# Patient Record
Sex: FEMALE | Race: WHITE | Employment: FULL TIME | URBAN - METROPOLITAN AREA
[De-identification: names, ages, dates, MRNs, and addresses within clinical notes are randomized per-mention and may not be internally consistent; named-entity substitution may affect disease eponyms.]

---

## 2019-06-17 ENCOUNTER — OFFICE VISIT (OUTPATIENT)
Dept: NEUROLOGY | Facility: CLINIC | Age: 24
End: 2019-06-17
Payer: COMMERCIAL

## 2019-06-17 VITALS
WEIGHT: 162.5 LBS | HEART RATE: 80 BPM | HEIGHT: 60 IN | SYSTOLIC BLOOD PRESSURE: 96 MMHG | DIASTOLIC BLOOD PRESSURE: 70 MMHG | BODY MASS INDEX: 31.9 KG/M2 | RESPIRATION RATE: 17 BRPM

## 2019-06-17 DIAGNOSIS — R76.8 POSITIVE ANA (ANTINUCLEAR ANTIBODY): ICD-10-CM

## 2019-06-17 DIAGNOSIS — G43.101 MIGRAINE WITH AURA AND WITH STATUS MIGRAINOSUS, NOT INTRACTABLE: Primary | ICD-10-CM

## 2019-06-17 DIAGNOSIS — M54.2 NECK PAIN: ICD-10-CM

## 2019-06-17 PROCEDURE — 99214 OFFICE O/P EST MOD 30 MIN: CPT | Performed by: PHYSICIAN ASSISTANT

## 2019-06-17 RX ORDER — RIZATRIPTAN BENZOATE 10 MG/1
TABLET ORAL
Qty: 10 TABLET | Refills: 2 | Status: SHIPPED | OUTPATIENT
Start: 2019-06-17 | End: 2019-08-08

## 2019-06-17 RX ORDER — PROPRANOLOL HCL 60 MG
CAPSULE, EXTENDED RELEASE 24HR ORAL
Refills: 0 | COMMUNITY
Start: 2019-05-21 | End: 2020-02-20

## 2019-06-17 NOTE — PROGRESS NOTES
[facility] with [address] [facility]  1/13/1995  Primary Care Provider:  None Pcp    6/17/2019  Accompanied visit: No      25year old female patient being seen for: Migraines    Patient w 2  PRN:     Allergies:  No Known Allergies         EXAM:  BP 96/70 (BP Location: Right arm, Patient Position: Sitting, Cuff Size: large)   Pulse 80   Resp 17   Ht 60\"   Wt 162 lb 8 oz   LMP 06/10/2019 (Exact Date)   Breastfeeding?  No   BMI 31.74 kg/m²   L

## 2019-06-24 ENCOUNTER — TELEPHONE (OUTPATIENT)
Dept: SURGERY | Facility: CLINIC | Age: 24
End: 2019-06-24

## 2019-06-24 NOTE — TELEPHONE ENCOUNTER
MRA had/MRI Brain denial reason:    Reason:  For 07376 MRA HEAD W/O CONTRAST Based on [employer] Head Imaging Guidelines Section: HD 11.3 Sudden Onset of Headache, we are unable to approve this request. An MRA or CTA of the head in the evaluation of headache s (TTY/TDD: 876), send a fax to [phone],      Standard Appeal    You, or an authorized representative (see above process for choosing someone to act for you), may appeal in writing or by phone.  To send an appeal in writing use the contact information ab

## 2019-07-08 NOTE — TELEPHONE ENCOUNTER
Left voice-mail message for patient informing her that MRI/MRA was denied by her insurance and to call us back.

## 2019-07-09 NOTE — TELEPHONE ENCOUNTER
Pt called back and looking for MRA Head & MRI Brain to be rewritten to get authorized. Call pt when completed.

## 2019-07-10 NOTE — TELEPHONE ENCOUNTER
Olimpia Will reports that her ENT is appealing the denial of MRI brain, and anticipates a determination by Friday. She will update office with outcome for next steps.

## 2019-07-10 NOTE — TELEPHONE ENCOUNTER
I am aware that patient prior to seeing me had an MRI Brain ordered by pcp and ENT. Please follow-up regarding the status of the MRI's ordered through them.

## 2019-07-16 NOTE — TELEPHONE ENCOUNTER
Pt updating us on status of MRI denial.  Dr. Tova Al office has completed the appeal and expect response soon.

## 2019-07-18 NOTE — TELEPHONE ENCOUNTER
Ashley Kyle called from Dr Quintero Batch office [phone] ext [phone]. She said that she is having problems completing the appeal for their MRI Brain because they do not have enough notes regarding patients headache (she was since only once).  She was only trying to do

## 2019-07-22 NOTE — TELEPHONE ENCOUNTER
Did peer to peer and they said we need to get information from the patient on how she is doing at this point she does not fit the criteria for MRI Brain approval.

## 2019-07-22 NOTE — TELEPHONE ENCOUNTER
Fatou Avila informed of continued denial after peer to peer. She reports that she has fewer dizzy spells after increasing amitritypline to daily usage. She does wake up dizzy and foggy but gets better after eating.     Does get migraines with some of the

## 2019-07-23 NOTE — TELEPHONE ENCOUNTER
Pt concerned about imaging denials. Her previous physician did not have any record of MRI they ordered. They also did not have much documentation in regards to migraine.   She is obtaining MRI report from [facility], suggested she also obtain CD of imag

## 2019-08-08 ENCOUNTER — APPOINTMENT (OUTPATIENT)
Dept: LAB | Age: 24
End: 2019-08-08
Attending: Other
Payer: COMMERCIAL

## 2019-08-08 ENCOUNTER — OFFICE VISIT (OUTPATIENT)
Dept: NEUROLOGY | Facility: CLINIC | Age: 24
End: 2019-08-08
Payer: COMMERCIAL

## 2019-08-08 VITALS
HEART RATE: 74 BPM | RESPIRATION RATE: 16 BRPM | WEIGHT: 161 LBS | SYSTOLIC BLOOD PRESSURE: 118 MMHG | HEIGHT: 60 IN | BODY MASS INDEX: 31.61 KG/M2 | DIASTOLIC BLOOD PRESSURE: 70 MMHG

## 2019-08-08 DIAGNOSIS — G43.101 MIGRAINE WITH AURA AND WITH STATUS MIGRAINOSUS, NOT INTRACTABLE: Primary | ICD-10-CM

## 2019-08-08 DIAGNOSIS — R76.8 POSITIVE ANA (ANTINUCLEAR ANTIBODY): ICD-10-CM

## 2019-08-08 PROCEDURE — 99213 OFFICE O/P EST LOW 20 MIN: CPT | Performed by: PHYSICIAN ASSISTANT

## 2019-08-08 PROCEDURE — 36415 COLL VENOUS BLD VENIPUNCTURE: CPT | Performed by: PHYSICIAN ASSISTANT

## 2019-08-08 PROCEDURE — 86038 ANTINUCLEAR ANTIBODIES: CPT | Performed by: PHYSICIAN ASSISTANT

## 2019-08-08 RX ORDER — PROPRANOLOL HYDROCHLORIDE 80 MG/1
1 CAPSULE, EXTENDED RELEASE ORAL DAILY
Qty: 30 CAPSULE | Refills: 3 | Status: SHIPPED | OUTPATIENT
Start: 2019-08-08 | End: 2019-12-06

## 2019-08-08 RX ORDER — RIZATRIPTAN BENZOATE 10 MG/1
TABLET ORAL
Qty: 10 TABLET | Refills: 2 | Status: SHIPPED | OUTPATIENT
Start: 2019-08-08 | End: 2020-02-20

## 2019-08-08 NOTE — PROGRESS NOTES
[facility] with [address] [facility]  1/13/1995  Primary Care Provider:  None Pcp    8/8/2019  Accompanied visit: No      25year old female patient being seen for: Headaches    Patient wa clear breath sounds  Heart S1S2, no abnormal sounds  Pink nailbeds no Cyanosis    NEURO  NAD, A&Ox3  EOMI  CN II-XII intact  Strength 5/5 all extremities  DTRs 2+ and symmetric  FTN intact bilaterally. No drift on exam  Normal gait. Tandem gait intact.  Laura Quick

## 2019-08-09 LAB — ANA SCREEN: NEGATIVE

## 2019-08-11 ENCOUNTER — TELEPHONE (OUTPATIENT)
Dept: NEUROLOGY | Facility: CLINIC | Age: 24
End: 2019-08-11

## 2019-08-12 NOTE — TELEPHONE ENCOUNTER
Called patient and left detailed voicemail on verified phone number (ok per HIPPA) informing patient of the below results. Encouraged patient to call JESSA with any questions or concerns. Office hours and phone number given. RENEE negative.

## 2019-12-17 ENCOUNTER — HOSPITAL ENCOUNTER (OUTPATIENT)
Dept: CV DIAGNOSTICS | Age: 24
Discharge: HOME | End: 2019-12-17
Attending: OTOLARYNGOLOGY
Payer: COMMERCIAL

## 2019-12-17 DIAGNOSIS — Z86.79 HISTORY OF CARDIAC MURMUR: ICD-10-CM

## 2019-12-17 PROCEDURE — 93306 TTE W/DOPPLER COMPLETE: CPT | Performed by: OTOLARYNGOLOGY
